# Patient Record
Sex: MALE | Race: WHITE | Employment: FULL TIME | URBAN - METROPOLITAN AREA
[De-identification: names, ages, dates, MRNs, and addresses within clinical notes are randomized per-mention and may not be internally consistent; named-entity substitution may affect disease eponyms.]

---

## 2018-05-16 ENCOUNTER — OFFICE VISIT (OUTPATIENT)
Dept: FAMILY MEDICINE CLINIC | Age: 38
End: 2018-05-16
Payer: COMMERCIAL

## 2018-05-16 VITALS
SYSTOLIC BLOOD PRESSURE: 120 MMHG | TEMPERATURE: 98.1 F | HEART RATE: 91 BPM | WEIGHT: 231 LBS | DIASTOLIC BLOOD PRESSURE: 80 MMHG | OXYGEN SATURATION: 98 % | RESPIRATION RATE: 18 BRPM | BODY MASS INDEX: 29.65 KG/M2 | HEIGHT: 74 IN

## 2018-05-16 DIAGNOSIS — J01.90 ACUTE SINUSITIS, RECURRENCE NOT SPECIFIED, UNSPECIFIED LOCATION: ICD-10-CM

## 2018-05-16 DIAGNOSIS — J02.9 ACUTE PHARYNGITIS, UNSPECIFIED ETIOLOGY: Primary | ICD-10-CM

## 2018-05-16 LAB — S PYO AG THROAT QL: NORMAL

## 2018-05-16 PROCEDURE — 99213 OFFICE O/P EST LOW 20 MIN: CPT | Performed by: PHYSICIAN ASSISTANT

## 2018-05-16 PROCEDURE — 87880 STREP A ASSAY W/OPTIC: CPT | Performed by: PHYSICIAN ASSISTANT

## 2018-05-16 RX ORDER — PREGABALIN 150 MG/1
150 CAPSULE ORAL DAILY
COMMUNITY

## 2018-05-16 RX ORDER — CYCLOBENZAPRINE HCL 10 MG
10 TABLET ORAL 3 TIMES DAILY PRN
COMMUNITY

## 2018-05-16 RX ORDER — TRAMADOL HYDROCHLORIDE 50 MG/1
50 TABLET ORAL EVERY 6 HOURS PRN
COMMUNITY

## 2018-05-16 RX ORDER — CEFDINIR 300 MG/1
300 CAPSULE ORAL 2 TIMES DAILY
Qty: 20 CAPSULE | Refills: 0 | Status: SHIPPED | OUTPATIENT
Start: 2018-05-16 | End: 2018-05-26

## 2018-06-08 ENCOUNTER — OFFICE VISIT (OUTPATIENT)
Dept: FAMILY MEDICINE CLINIC | Age: 38
End: 2018-06-08
Payer: COMMERCIAL

## 2018-06-08 VITALS
OXYGEN SATURATION: 96 % | RESPIRATION RATE: 12 BRPM | BODY MASS INDEX: 28.62 KG/M2 | WEIGHT: 223 LBS | TEMPERATURE: 98.8 F | SYSTOLIC BLOOD PRESSURE: 120 MMHG | DIASTOLIC BLOOD PRESSURE: 90 MMHG | HEART RATE: 96 BPM | HEIGHT: 74 IN

## 2018-06-08 DIAGNOSIS — J32.9 CHRONIC SINUSITIS, UNSPECIFIED LOCATION: Primary | ICD-10-CM

## 2018-06-08 DIAGNOSIS — H92.03 OTALGIA OF BOTH EARS: ICD-10-CM

## 2018-06-08 PROCEDURE — G8419 CALC BMI OUT NRM PARAM NOF/U: HCPCS | Performed by: PHYSICIAN ASSISTANT

## 2018-06-08 PROCEDURE — G8427 DOCREV CUR MEDS BY ELIG CLIN: HCPCS | Performed by: PHYSICIAN ASSISTANT

## 2018-06-08 PROCEDURE — 1036F TOBACCO NON-USER: CPT | Performed by: PHYSICIAN ASSISTANT

## 2018-06-08 PROCEDURE — 99213 OFFICE O/P EST LOW 20 MIN: CPT | Performed by: PHYSICIAN ASSISTANT

## 2018-06-08 RX ORDER — DOXYCYCLINE HYCLATE 100 MG
100 TABLET ORAL 2 TIMES DAILY
Qty: 20 TABLET | Refills: 0 | Status: SHIPPED | OUTPATIENT
Start: 2018-06-08 | End: 2018-06-18

## 2018-06-08 RX ORDER — METHYLPHENIDATE HYDROCHLORIDE 20 MG/1
20 TABLET ORAL
COMMUNITY

## 2019-05-28 ENCOUNTER — HOSPITAL ENCOUNTER (EMERGENCY)
Age: 39
Discharge: HOME | End: 2019-05-28
Payer: COMMERCIAL

## 2019-05-28 VITALS
WEIGHT: 232 LBS | RESPIRATION RATE: 20 BRPM | HEIGHT: 75 IN | TEMPERATURE: 98.7 F | HEART RATE: 108 BPM | BODY MASS INDEX: 28.85 KG/M2 | DIASTOLIC BLOOD PRESSURE: 94 MMHG | OXYGEN SATURATION: 100 % | SYSTOLIC BLOOD PRESSURE: 132 MMHG

## 2019-05-28 DIAGNOSIS — G89.29 ACUTE EXACERBATION OF CHRONIC LOW BACK PAIN: Primary | ICD-10-CM

## 2019-05-28 DIAGNOSIS — M54.50 ACUTE EXACERBATION OF CHRONIC LOW BACK PAIN: Primary | ICD-10-CM

## 2019-05-28 PROCEDURE — 6370000000 HC RX 637 (ALT 250 FOR IP): Performed by: NURSE PRACTITIONER

## 2019-05-28 PROCEDURE — 99282 EMERGENCY DEPT VISIT SF MDM: CPT

## 2019-05-28 PROCEDURE — 96372 THER/PROPH/DIAG INJ SC/IM: CPT

## 2019-05-28 PROCEDURE — 6360000002 HC RX W HCPCS: Performed by: NURSE PRACTITIONER

## 2019-05-28 RX ORDER — ONDANSETRON 4 MG/1
4 TABLET, ORALLY DISINTEGRATING ORAL ONCE
Status: COMPLETED | OUTPATIENT
Start: 2019-05-28 | End: 2019-05-28

## 2019-05-28 RX ORDER — MORPHINE SULFATE 2 MG/ML
4 INJECTION, SOLUTION INTRAMUSCULAR; INTRAVENOUS ONCE
Status: COMPLETED | OUTPATIENT
Start: 2019-05-28 | End: 2019-05-28

## 2019-05-28 RX ORDER — NAPROXEN 500 MG/1
500 TABLET ORAL 2 TIMES DAILY
Qty: 14 TABLET | Refills: 0 | Status: SHIPPED | OUTPATIENT
Start: 2019-05-28 | End: 2019-06-04

## 2019-05-28 RX ORDER — DEXAMETHASONE SODIUM PHOSPHATE 10 MG/ML
8 INJECTION INTRAMUSCULAR; INTRAVENOUS ONCE
Status: COMPLETED | OUTPATIENT
Start: 2019-05-28 | End: 2019-05-28

## 2019-05-28 RX ADMIN — MORPHINE SULFATE 4 MG: 2 INJECTION, SOLUTION INTRAMUSCULAR; INTRAVENOUS at 13:41

## 2019-05-28 RX ADMIN — ONDANSETRON 4 MG: 4 TABLET, ORALLY DISINTEGRATING ORAL at 13:41

## 2019-05-28 RX ADMIN — DEXAMETHASONE SODIUM PHOSPHATE 8 MG: 10 INJECTION INTRAMUSCULAR; INTRAVENOUS at 13:41

## 2019-05-28 ASSESSMENT — PAIN DESCRIPTION - ORIENTATION: ORIENTATION: MID

## 2019-05-28 ASSESSMENT — PAIN SCALES - GENERAL
PAINLEVEL_OUTOF10: 8
PAINLEVEL_OUTOF10: 8

## 2019-05-28 ASSESSMENT — PAIN DESCRIPTION - LOCATION: LOCATION: BACK

## 2019-05-28 ASSESSMENT — PAIN DESCRIPTION - PAIN TYPE: TYPE: ACUTE PAIN

## 2019-05-30 NOTE — ED PROVIDER NOTES
[facility]     Department of Emergency Medicine   ED  Provider Note  Admit Date/RoomTime: 5/28/2019 12:59 PM  ED Room: 30/30  Chief Complaint   Back Pain (Chronic back pain due to injury 15 years ago, woke up with worse pain this morning)    History of Present Illness   Source of history provided by:  patient. History/Exam Limitations: none. Cachorro Schmitzkeeper is a 44 y.o. old male with a prior history of recurrent self limited episodes of low back pain in the past, previous osteoarthritis of lumbar spine and previous herniated disc, presents to the emergency department by private vehicle, for acute-on-chronic, aching and cramping bilateral lower lumbar spine pain without radiation, for several day(s) prior to arrival worsening this am .  The original pain was caused by twisting. There has been no history of recent injury. Since onset the symptoms have been intermittent and mild in severity. He denies any of the following: bladder incontinence, bladder urgency, bowel incontinence, bowel urgency or sacral numbness. Associated Signs & Symptoms: none. The pain is aggraveated by movement and relieved by nothing. There has been no abdominal pain, cramping, vomiting, diarrhea, fever, chills, sweats, headache, arthralgias, myalgias, irritability, URI symptoms or cough. He is enrolled in pain management program.    .  ROS    Pertinent positives and negatives are stated within HPI, all other systems reviewed and are negative. Past Surgical History:  has a past surgical history that includes Appendectomy. Social History:  reports that he has never smoked. He has never used smokeless tobacco. He reports that he drinks alcohol. He reports that he does not use drugs. Family History: family history is not on file.   Allergies: Pcn [penicillins]    Physical Exam           ED Triage Vitals [05/28/19 1256]   BP Temp Temp Source Pulse Resp SpO2 Height Weight   (!) 132/94 98.7 °F (37.1 °C) Oral 108 20 100 % 6' 3\" (1.905 m) 232 lb (105.2 kg)      Oxygen Saturation Interpretation: Normal.    Constitutional:  Alert, development consistent with age. HEENT:  NC/NT. Airway patent. Neck:  Normal ROM. Supple. Respiratory:  Clear to auscultation and breath sounds equal.  CV:  Regular rate and rhythm, normal heart sounds, without pathological murmurs, ectopy, gallops, or rubs. GI:  Abdomen Soft, nontender, good bowel sounds. No firm or pulsatile mass. Back: lower lumbar spine bilateral.             Tenderness: Mild. Swelling: no.              Range of Motion: full range with pain. CVA Tenderness: No.            Straight leg raising:  Bilateral negative. Skin:  no erythema, rash or swelling noted. Distal Function:              Motor deficit: none. Sensory deficit: none. Pulse deficit: none. Calf Tenderness:  No Bilateral.               Edema:  none Both lower extremity(s). Reflexes: Bilateral knee,ankle,biceps normal.  Gait:  normal.  Integument:  Normal turgor. Warm, dry, without visible rash. Lymphatics: No lymphangitis or adenopathy noted. Neurological:  Oriented. Motor functions intact. Lab / Imaging Results   (All laboratory and radiology results have been personally reviewed by myself)  Labs:  No results found for this visit on 05/28/19. Imaging: All Radiology results interpreted by Radiologist unless otherwise noted.   No orders to display       ED Course / Medical Decision Making     Medications   morphine injection 4 mg (4 mg Intramuscular Given 5/28/19 1341)   dexamethasone (DECADRON) injection 8 mg (8 mg Intramuscular Given 5/28/19 1341)   ondansetron (ZOFRAN-ODT) disintegrating tablet 4 mg (4 mg Oral Given 5/28/19 1341)        Re-examination:  5/28/19       Time: 1350    Patients symptoms have improved with treatment    Consult(s):   None    Procedure(s):   none    Medical Decision Making/Counseling:    *At this time the patient is without objective evidence of an acute process requiring inpatient management. The emergency provider has spoken with the patient and discussed todays emergency visit, in addition to providing specific details for the plan of care and counseling regarding the diagnosis and prognosis. He was counseled on the role of the emergency department regarding prescribing medications for chronic conditions including Narcotic and other controlled substances. Based on the presenting complaint and nature of illness, the requested medications will not be provided today in prescription form and he is instructed to contact their provider for supplemental medications as soon as possible. Questions are answered at this time and they are agreeable with the plan. Assessment      1. Acute exacerbation of chronic low back pain      Plan   Discharge to home  Patient condition is good    New Medications     Discharge Medication List as of 5/28/2019  1:35 PM      START taking these medications    Details   naproxen (NAPROSYN) 500 MG tablet Take 1 tablet by mouth 2 times daily for 7 days, Disp-14 tablet, R-0Print           Electronically signed by ZACARIAS Rico CNP   DD: 5/30/19  **This report was transcribed using voice recognition software. Every effort was made to ensure accuracy; however, inadvertent computerized transcription errors may be present.   END OF ED PROVIDER NOTE     ZACARIAS Rico CNP  05/30/19 6874

## 2020-07-09 ENCOUNTER — HOSPITAL ENCOUNTER (OUTPATIENT)
Age: 40
Discharge: HOME | End: 2020-07-11

## 2020-07-09 PROCEDURE — 88305 TISSUE EXAM BY PATHOLOGIST: CPT

## 2024-03-10 ENCOUNTER — HOSPITAL ENCOUNTER (INPATIENT)
Age: 44
LOS: 2 days | Discharge: HOME | DRG: 882 | End: 2024-03-13
Attending: STUDENT IN AN ORGANIZED HEALTH CARE EDUCATION/TRAINING PROGRAM | Admitting: PSYCHIATRY & NEUROLOGY
Payer: COMMERCIAL

## 2024-03-10 DIAGNOSIS — R45.851 SUICIDAL IDEATION: Primary | ICD-10-CM

## 2024-03-10 LAB
ALBUMIN SERPL-MCNC: 4.8 G/DL (ref 3.5–5.2)
ALP SERPL-CCNC: 75 U/L (ref 40–129)
ALT SERPL-CCNC: 28 U/L (ref 0–40)
AMPHET UR QL SCN: NEGATIVE
ANION GAP SERPL CALCULATED.3IONS-SCNC: 12 MMOL/L (ref 7–16)
APAP SERPL-MCNC: <5 UG/ML (ref 10–30)
AST SERPL-CCNC: 20 U/L (ref 0–39)
BARBITURATES UR QL SCN: NEGATIVE
BASOPHILS # BLD: 0.07 K/UL (ref 0–0.2)
BASOPHILS NFR BLD: 1 % (ref 0–2)
BENZODIAZ UR QL: NEGATIVE
BILIRUB SERPL-MCNC: 0.3 MG/DL (ref 0–1.2)
BILIRUB UR QL STRIP: NEGATIVE
BUN SERPL-MCNC: 10 MG/DL (ref 6–20)
BUPRENORPHINE UR QL: NEGATIVE
CALCIUM SERPL-MCNC: 9.6 MG/DL (ref 8.6–10.2)
CANNABINOIDS UR QL SCN: NEGATIVE
CHLORIDE SERPL-SCNC: 102 MMOL/L (ref 98–107)
CLARITY UR: CLEAR
CO2 SERPL-SCNC: 26 MMOL/L (ref 22–29)
COCAINE UR QL SCN: NEGATIVE
COLOR UR: YELLOW
CREAT SERPL-MCNC: 1 MG/DL (ref 0.7–1.2)
EOSINOPHIL # BLD: 0.08 K/UL (ref 0.05–0.5)
EOSINOPHILS RELATIVE PERCENT: 1 % (ref 0–6)
ERYTHROCYTE [DISTWIDTH] IN BLOOD BY AUTOMATED COUNT: 12.8 % (ref 11.5–15)
ETHANOLAMINE SERPL-MCNC: 21 MG/DL
FENTANYL UR QL: NEGATIVE
GFR SERPL CREATININE-BSD FRML MDRD: >60 ML/MIN/1.73M2
GLUCOSE SERPL-MCNC: 94 MG/DL (ref 74–99)
GLUCOSE UR STRIP-MCNC: NEGATIVE MG/DL
HCT VFR BLD AUTO: 47.1 % (ref 37–54)
HGB BLD-MCNC: 15.5 G/DL (ref 12.5–16.5)
HGB UR QL STRIP.AUTO: NEGATIVE
IMM GRANULOCYTES # BLD AUTO: <0.03 K/UL (ref 0–0.58)
IMM GRANULOCYTES NFR BLD: 0 % (ref 0–5)
KETONES UR STRIP-MCNC: NEGATIVE MG/DL
LEUKOCYTE ESTERASE UR QL STRIP: ABNORMAL
LYMPHOCYTES NFR BLD: 1.39 K/UL (ref 1.5–4)
LYMPHOCYTES RELATIVE PERCENT: 23 % (ref 20–42)
MCH RBC QN AUTO: 29.5 PG (ref 26–35)
MCHC RBC AUTO-ENTMCNC: 32.9 G/DL (ref 32–34.5)
MCV RBC AUTO: 89.5 FL (ref 80–99.9)
METHADONE UR QL: NEGATIVE
MONOCYTES NFR BLD: 0.4 K/UL (ref 0.1–0.95)
MONOCYTES NFR BLD: 7 % (ref 2–12)
NEUTROPHILS NFR BLD: 67 % (ref 43–80)
NEUTS SEG NFR BLD: 4 K/UL (ref 1.8–7.3)
NITRITE UR QL STRIP: NEGATIVE
OPIATES UR QL SCN: NEGATIVE
OXYCODONE UR QL SCN: NEGATIVE
PCP UR QL SCN: NEGATIVE
PH UR STRIP: 6 [PH] (ref 5–9)
PLATELET # BLD AUTO: 299 K/UL (ref 130–450)
PMV BLD AUTO: 9.8 FL (ref 7–12)
POTASSIUM SERPL-SCNC: 3.7 MMOL/L (ref 3.5–5)
PROT SERPL-MCNC: 7.9 G/DL (ref 6.4–8.3)
PROT UR STRIP-MCNC: NEGATIVE MG/DL
RBC # BLD AUTO: 5.26 M/UL (ref 3.8–5.8)
RBC #/AREA URNS HPF: ABNORMAL /HPF
SALICYLATES SERPL-MCNC: <0.3 MG/DL (ref 0–30)
SODIUM SERPL-SCNC: 140 MMOL/L (ref 132–146)
SP GR UR STRIP: 1.02 (ref 1–1.03)
TEST INFORMATION: NORMAL
TOXIC TRICYCLIC SC,BLOOD: NEGATIVE
UROBILINOGEN UR STRIP-ACNC: 0.2 EU/DL (ref 0–1)
WBC #/AREA URNS HPF: ABNORMAL /HPF
WBC OTHER # BLD: 6 K/UL (ref 4.5–11.5)

## 2024-03-10 PROCEDURE — 80143 DRUG ASSAY ACETAMINOPHEN: CPT

## 2024-03-10 PROCEDURE — 99285 EMERGENCY DEPT VISIT HI MDM: CPT

## 2024-03-10 PROCEDURE — 85025 COMPLETE CBC W/AUTO DIFF WBC: CPT

## 2024-03-10 PROCEDURE — 80179 DRUG ASSAY SALICYLATE: CPT

## 2024-03-10 PROCEDURE — G0480 DRUG TEST DEF 1-7 CLASSES: HCPCS

## 2024-03-10 PROCEDURE — 80307 DRUG TEST PRSMV CHEM ANLYZR: CPT

## 2024-03-10 PROCEDURE — 80053 COMPREHEN METABOLIC PANEL: CPT

## 2024-03-10 PROCEDURE — 81001 URINALYSIS AUTO W/SCOPE: CPT

## 2024-03-10 PROCEDURE — 6370000000 HC RX 637 (ALT 250 FOR IP): Performed by: STUDENT IN AN ORGANIZED HEALTH CARE EDUCATION/TRAINING PROGRAM

## 2024-03-10 PROCEDURE — 93005 ELECTROCARDIOGRAM TRACING: CPT

## 2024-03-10 RX ORDER — ACETAMINOPHEN 325 MG/1
650 TABLET ORAL ONCE
Status: COMPLETED | OUTPATIENT
Start: 2024-03-10 | End: 2024-03-10

## 2024-03-10 RX ADMIN — ACETAMINOPHEN 650 MG: 325 TABLET ORAL at 21:25

## 2024-03-10 ASSESSMENT — PAIN DESCRIPTION - LOCATION
LOCATION: HEAD
LOCATION: HEAD

## 2024-03-10 ASSESSMENT — LIFESTYLE VARIABLES
HOW OFTEN DO YOU HAVE A DRINK CONTAINING ALCOHOL: 2-3 TIMES A WEEK
HOW MANY STANDARD DRINKS CONTAINING ALCOHOL DO YOU HAVE ON A TYPICAL DAY: 3 OR 4

## 2024-03-10 ASSESSMENT — PAIN DESCRIPTION - ONSET
ONSET: ON-GOING
ONSET: ON-GOING

## 2024-03-10 ASSESSMENT — PAIN DESCRIPTION - FREQUENCY
FREQUENCY: CONTINUOUS
FREQUENCY: CONTINUOUS

## 2024-03-10 ASSESSMENT — PAIN DESCRIPTION - PAIN TYPE
TYPE: ACUTE PAIN
TYPE: ACUTE PAIN

## 2024-03-10 ASSESSMENT — PAIN SCALES - GENERAL
PAINLEVEL_OUTOF10: 8
PAINLEVEL_OUTOF10: 4

## 2024-03-10 ASSESSMENT — PAIN - FUNCTIONAL ASSESSMENT
PAIN_FUNCTIONAL_ASSESSMENT: 0-10
PAIN_FUNCTIONAL_ASSESSMENT: NONE - DENIES PAIN

## 2024-03-11 PROBLEM — F43.25 ADJUSTMENT DISORDER WITH MIXED DISTURBANCE OF EMOTIONS AND CONDUCT: Status: ACTIVE | Noted: 2024-03-11

## 2024-03-11 PROBLEM — F32.9 MAJOR DEPRESSION, SINGLE EPISODE: Status: RESOLVED | Noted: 2024-03-11 | Resolved: 2024-03-11

## 2024-03-11 PROBLEM — F32.9 MAJOR DEPRESSION, SINGLE EPISODE: Status: ACTIVE | Noted: 2024-03-11

## 2024-03-11 PROCEDURE — 6370000000 HC RX 637 (ALT 250 FOR IP): Performed by: PSYCHIATRY & NEUROLOGY

## 2024-03-11 PROCEDURE — 1240000000 HC EMOTIONAL WELLNESS R&B

## 2024-03-11 PROCEDURE — 90792 PSYCH DIAG EVAL W/MED SRVCS: CPT | Performed by: NURSE PRACTITIONER

## 2024-03-11 PROCEDURE — 6370000000 HC RX 637 (ALT 250 FOR IP): Performed by: NURSE PRACTITIONER

## 2024-03-11 RX ORDER — TRAMADOL HYDROCHLORIDE 50 MG/1
50 TABLET ORAL EVERY 6 HOURS PRN
Status: DISCONTINUED | OUTPATIENT
Start: 2024-03-11 | End: 2024-03-13 | Stop reason: HOSPADM

## 2024-03-11 RX ORDER — HALOPERIDOL 5 MG/ML
5 INJECTION INTRAMUSCULAR EVERY 6 HOURS PRN
Status: DISCONTINUED | OUTPATIENT
Start: 2024-03-11 | End: 2024-03-13 | Stop reason: HOSPADM

## 2024-03-11 RX ORDER — LANOLIN ALCOHOL/MO/W.PET/CERES
3 CREAM (GRAM) TOPICAL NIGHTLY PRN
Status: DISCONTINUED | OUTPATIENT
Start: 2024-03-11 | End: 2024-03-13 | Stop reason: HOSPADM

## 2024-03-11 RX ORDER — HALOPERIDOL 5 MG/1
5 TABLET ORAL EVERY 6 HOURS PRN
Status: DISCONTINUED | OUTPATIENT
Start: 2024-03-11 | End: 2024-03-13 | Stop reason: HOSPADM

## 2024-03-11 RX ORDER — CYCLOBENZAPRINE HCL 10 MG
10 TABLET ORAL 3 TIMES DAILY PRN
Status: DISCONTINUED | OUTPATIENT
Start: 2024-03-11 | End: 2024-03-13 | Stop reason: HOSPADM

## 2024-03-11 RX ORDER — DIVALPROEX SODIUM 500 MG/1
500 TABLET, EXTENDED RELEASE ORAL NIGHTLY
Status: DISCONTINUED | OUTPATIENT
Start: 2024-03-11 | End: 2024-03-13 | Stop reason: HOSPADM

## 2024-03-11 RX ORDER — MAGNESIUM HYDROXIDE/ALUMINUM HYDROXICE/SIMETHICONE 120; 1200; 1200 MG/30ML; MG/30ML; MG/30ML
30 SUSPENSION ORAL PRN
Status: DISCONTINUED | OUTPATIENT
Start: 2024-03-11 | End: 2024-03-13 | Stop reason: HOSPADM

## 2024-03-11 RX ORDER — HYDROXYZINE PAMOATE 25 MG/1
50 CAPSULE ORAL 3 TIMES DAILY PRN
Status: DISCONTINUED | OUTPATIENT
Start: 2024-03-11 | End: 2024-03-13 | Stop reason: HOSPADM

## 2024-03-11 RX ORDER — NICOTINE 21 MG/24HR
1 PATCH, TRANSDERMAL 24 HOURS TRANSDERMAL DAILY
Status: DISCONTINUED | OUTPATIENT
Start: 2024-03-11 | End: 2024-03-11

## 2024-03-11 RX ORDER — ACETAMINOPHEN 325 MG/1
650 TABLET ORAL EVERY 6 HOURS PRN
Status: DISCONTINUED | OUTPATIENT
Start: 2024-03-11 | End: 2024-03-13 | Stop reason: HOSPADM

## 2024-03-11 RX ORDER — PREGABALIN 75 MG/1
150 CAPSULE ORAL DAILY
Status: DISCONTINUED | OUTPATIENT
Start: 2024-03-11 | End: 2024-03-13 | Stop reason: HOSPADM

## 2024-03-11 RX ADMIN — DIVALPROEX SODIUM 500 MG: 500 TABLET, EXTENDED RELEASE ORAL at 21:06

## 2024-03-11 RX ADMIN — MILNACIPRAN HYDROCHLORIDE 50 MG: 100 TABLET, FILM COATED ORAL at 21:05

## 2024-03-11 RX ADMIN — ACETAMINOPHEN 650 MG: 325 TABLET ORAL at 20:02

## 2024-03-11 RX ADMIN — Medication 3 MG: at 21:05

## 2024-03-11 RX ADMIN — TRAMADOL HYDROCHLORIDE 50 MG: 50 TABLET ORAL at 17:01

## 2024-03-11 RX ADMIN — PREGABALIN 150 MG: 75 CAPSULE ORAL at 16:59

## 2024-03-11 RX ADMIN — MILNACIPRAN HYDROCHLORIDE 50 MG: 100 TABLET, FILM COATED ORAL at 16:59

## 2024-03-11 ASSESSMENT — PAIN DESCRIPTION - LOCATION
LOCATION: BACK
LOCATION: HEAD
LOCATION: HEAD

## 2024-03-11 ASSESSMENT — PATIENT HEALTH QUESTIONNAIRE - PHQ9
SUM OF ALL RESPONSES TO PHQ QUESTIONS 1-9: 0
SUM OF ALL RESPONSES TO PHQ9 QUESTIONS 1 & 2: 0
SUM OF ALL RESPONSES TO PHQ QUESTIONS 1-9: 0
2. FEELING DOWN, DEPRESSED OR HOPELESS: 0
SUM OF ALL RESPONSES TO PHQ QUESTIONS 1-9: 0
1. LITTLE INTEREST OR PLEASURE IN DOING THINGS: 0
SUM OF ALL RESPONSES TO PHQ QUESTIONS 1-9: 0
1. LITTLE INTEREST OR PLEASURE IN DOING THINGS: 0

## 2024-03-11 ASSESSMENT — PAIN DESCRIPTION - FREQUENCY
FREQUENCY: CONTINUOUS
FREQUENCY: CONTINUOUS

## 2024-03-11 ASSESSMENT — SLEEP AND FATIGUE QUESTIONNAIRES
SLEEP PATTERN: DISTURBED/INTERRUPTED SLEEP
AVERAGE NUMBER OF SLEEP HOURS: 7
DO YOU HAVE DIFFICULTY SLEEPING: NO
DO YOU USE A SLEEP AID: YES
DO YOU USE A SLEEP AID: NO
AVERAGE NUMBER OF SLEEP HOURS: 7
DO YOU HAVE DIFFICULTY SLEEPING: YES

## 2024-03-11 ASSESSMENT — PAIN DESCRIPTION - PAIN TYPE
TYPE: ACUTE PAIN
TYPE: ACUTE PAIN

## 2024-03-11 ASSESSMENT — PAIN DESCRIPTION - DESCRIPTORS: DESCRIPTORS: ACHING

## 2024-03-11 ASSESSMENT — PAIN SCALES - GENERAL
PAINLEVEL_OUTOF10: 6
PAINLEVEL_OUTOF10: 4
PAINLEVEL_OUTOF10: 4

## 2024-03-11 ASSESSMENT — PAIN DESCRIPTION - ONSET
ONSET: ON-GOING
ONSET: ON-GOING

## 2024-03-11 NOTE — ED PROVIDER NOTES
Department of Emergency Medicine     Written by: Moises Henry MD  Patient Name: Dominic Phan  Admit Date: 3/10/2024  8:31 PM  MRN: [id number]                   : 1980    HPI  Chief Complaint   Patient presents with    Suicidal     (With a plan, patient states that he had a gun next to him in truck)       Dominic Phan is a 44 y.o. male that presents to the ED with concerns of suicidal ideation.  Patient had domestic issues with the last 3 weeks where his wife is cheating on him.  Today he was found next appreciation with a gun to his head, he threatened to kill himself.  He has drank alcohol today and over the past few days more than normal.  No drug use.  No chest pain shortness of abdominal pain nausea vomiting.  The patient mentions his mother is a psychologist, and he is looking for help.  He says he has been seeing a therapist over the last 2 weeks which has not gone well    Review of systems:  Pertinent positives and negatives mentioned in the HPI/MDM.    Physical Exam  Constitutional:       General: He is not in acute distress.     Appearance: Normal appearance.   Eyes:      Extraocular Movements: Extraocular movements intact.      Pupils: Pupils are equal, round, and reactive to light.   Cardiovascular:      Rate and Rhythm: Normal rate and regular rhythm.      Pulses: Normal pulses.      Heart sounds: Normal heart sounds.   Pulmonary:      Effort: Pulmonary effort is normal. No respiratory distress.   Abdominal:      Palpations: Abdomen is soft.      Tenderness: There is no abdominal tenderness.   Neurological:      Mental Status: He is alert and oriented to person, place, and time. Mental status is at baseline.      Comments: Sad affect          Chart review: The patient was seen by rheumatology for fibromyalgia and chronic pain syndrome on 2019    Social determinants: Suicidal ideation, held in behavioral health unit    Acute or chronic illness limiting or affecting

## 2024-03-11 NOTE — ED TRIAGE NOTES
Patient stated he had a gun next to him in his truck, recently found out his wife was having an affair, states he has been with her for a long time. They have 3 children together and the thought of them is what stopped him.

## 2024-03-11 NOTE — CARE COORDINATION
Biopsychosocial Assessment Note    Social work met with patient to complete the biopsychosocial assessment and C-SSRS.     Chief Complaint: pt reports \"because I verbalized that I was thinking of killing myself. I was in a car with an empty gun.\"     Mental Status Exam: pt alert&oriented x4. Pt cooperative, friendly. Pt mood anxious, sad, affect worried, congruent. Pt eye contact fair, speech clear. Pt thoughts linear, logical. Pt insight/judgement poor. Pt denied SI/HI/AVH.    Clinical Summary: pt reports he verbalized he was thinking about killing himself with an empty gun next to him. Pt has been  to his wife for 20 years and with her in general for almost 30. He found out about three weeks ago that she was having an affair for several moths. Pt reports they started to see a therapist because of this. Pt reports he has never felt depressed and he does not think he was depressed prior to admission. Pt reports feeling sad. Pt stated he holds all of his emotions in and has \"always been the though arnie.\" Pt reports prior to crying on the day of admission, he had only cried two other times in 25 years. Pt reports prior to admission his wife was preparing a meal for the family. When he pulled into his driveway he started crying. Pt reports he didn't want his kids to see him that way so he stayed in the car. After about an hour of crying he started to have negative thoughts. Pt reports his wife knew he was going to be home in 10 minutes but no one called to see where he was. After an hour and a half his wife texted him and then came outside. Pt reports his wife laughed off him saying \"what if I put a gun to my head and pulled the trigger.\" Pt stated he was thinking to himself what they would be better off without him. Pt reports his wife just told him to come inside for dinner and walked away. Pt report that turned his sadness into anger and resentment. Pt reports at that moment he went to the garage, got a gun,

## 2024-03-11 NOTE — ED NOTES
Called Dr. Mccarthy to present patient for admission, waiting for call back from physician.   
Patient continues to sleep, no distress noted.   
Patient currently A&OX4, respirations non-labored, skin warm/dry, no distress noted. Patient calm and cooperative at present. Patient denies SI, HI, or any hallucinations at present. Patient cooperative for vitals. Patient denies being a daily alcohol drinker. Patient denies any history of alcohol withdrawal. Patient's only complaint at present is headache but states he thinks headache is from crying for hours earlier.   
Patient was accepted to [facility] by Dr. Mccarthy. Per YAN Patel on [facility] patient will go to bed 7519B. Called and notified Pepper in admitting.    
Pt also has a watch in small bag placed together in locker 27.  
Pt arrived through EMS calm cooperative and changed, 2 bags of belongings including a cellphone and a wallet in locker 27, offered water and a blanket.  
Registration at bedside at this time.   
Registration has not yet registered patient.   
Report given to YAN Houser.    
Second call to Dr. Mccarthy to present patient for admission, waiting for call back from physician.   
Spoke to Dr. Ramos who states patient does not need constant observation in ED Section G, patient okay for 15 minute monitoring. Other suicide precautions to remain in place.   
Telephone report called to floor, spoke to YAN Hensley who requested transport not be requested until 0730 due to shift change.     
Third call to Dr. Mccarthy to present patient, waiting for call back from physician.  
yrs    Education Level:  [employer] year [employer]- joined [employer] after 911 started    Violence Risk Screening:        Have you ever thought about hurting someone?   []  No  [x]  Yes (Ask the questions listed below)  Only while in the service   When?    Did you follow through with the thoughts?      [] No     [] Yes- When and what happened?  2.  Have you ever threatened anyone?  [x]  No  []  Yes (Ask the questions listed below)   When and what happened?    Have you ever threatened someone with a gun, knife or other weapon?   []  No  []  Yes - When and what happened?  2. Have you ever had an order of protection taken out against you? []  Yes [x]  No  3. Have you ever been arrested due to violence? []  Yes [x]  No  4. Have you ever been cruel to animals? []  Yes [x]  No    After consideration of C-SSRS screening results, C-SSRS assessments, and this professional's assessment the patient's overall suicide risk assessed to be:  [] No Risk  [] Low   [] Moderate   [x] High     [x] Discussed current suicide risk, protective and risk factors with RN and ED Physician.    Consulted with ED Physician. Disposition/level of care recommended at this time:  [] Home:   [] Outpatient Provider:   [] Crisis Unit:   [x] Inpatient Psychiatric Unit:  Review for admission  [] Other:

## 2024-03-11 NOTE — H&P
mouth 2 times daily for 7 days  methylphenidate (RITALIN) 20 MG tablet, Take 1 tablet by mouth. (Patient not taking: Reported on 3/11/2024)  pregabalin (LYRICA) 150 MG capsule, Take 1 capsule by mouth daily.  Milnacipran HCl (SAVELLA PO), Take by mouth 2 times daily  traMADol (ULTRAM) 50 MG tablet, Take 1 tablet by mouth every 6 hours as needed for Pain. Up to 5x times daily prn  cyclobenzaprine (FLEXERIL) 10 MG tablet, Take 1 tablet by mouth 3 times daily as needed for Muscle spasms  Finasteride (PROPECIA PO), Take by mouth        Past Medical History:        Diagnosis Date    ADD (attention deficit disorder)     Broken back     Fibromyalgia     Fibromyalgia     Viral meningitis        Past Surgical History:        Procedure Laterality Date    APPENDECTOMY         Allergies:   Pcn [penicillins]      EXAMINATION:    REVIEW OF SYSTEMS:    ROS:  [x] All negative/unchanged except if checked. Explain positive(checked items) below:  [] Constitutional  [] Eyes  [] Ear/Nose/Mouth/Throat  [] Respiratory  [] CV  [] GI  []   [] Musculoskeletal  [] Skin/Breast  [] Neurological  [] Endocrine  [] Heme/Lymph  [] Allergic/Immunologic    Explanation:     Vitals:  BP (!) 135/95   Pulse (!) 104   Temp 97.3 °F (36.3 °C) (Temporal)   Resp 16   Ht 1.905 m (6' 3\")   Wt 104.3 kg (230 lb)   SpO2 97%   BMI 28.75 kg/m²      Mental Status Examination:      Mental status exam revealed a 43 year-old male in a hospital gown, good hygiene and grooming. Cooperative and forthcoming in revealing information. Psychomotor revealed no agitation, retardation, or any other abnormal or odd postures. Speech was coherent, normal rate, rhythm, and tone. Eye contact is good. Mood was \"I am normal and humbled\" and affect was mood congruent calm and contemplative, non-tearful, non-agitated.    Thought process is logical, without flight of ideas, or looseness of association. Thought contents are devoid of auditory or visual hallucinations, delusions or

## 2024-03-12 VITALS
OXYGEN SATURATION: 99 % | HEIGHT: 75 IN | SYSTOLIC BLOOD PRESSURE: 137 MMHG | DIASTOLIC BLOOD PRESSURE: 91 MMHG | HEART RATE: 86 BPM | BODY MASS INDEX: 28.6 KG/M2 | WEIGHT: 230 LBS | TEMPERATURE: 98.4 F | RESPIRATION RATE: 16 BRPM

## 2024-03-12 LAB
CHOLEST SERPL-MCNC: 191 MG/DL
HBA1C MFR BLD: 5.5 % (ref 4–5.6)
HDLC SERPL-MCNC: 86 MG/DL
LDLC SERPL CALC-MCNC: 85 MG/DL
TRIGL SERPL-MCNC: 100 MG/DL
VLDLC SERPL CALC-MCNC: 20 MG/DL

## 2024-03-12 PROCEDURE — 99232 SBSQ HOSP IP/OBS MODERATE 35: CPT | Performed by: NURSE PRACTITIONER

## 2024-03-12 PROCEDURE — 83036 HEMOGLOBIN GLYCOSYLATED A1C: CPT

## 2024-03-12 PROCEDURE — 36415 COLL VENOUS BLD VENIPUNCTURE: CPT

## 2024-03-12 PROCEDURE — 80061 LIPID PANEL: CPT

## 2024-03-12 PROCEDURE — 6370000000 HC RX 637 (ALT 250 FOR IP): Performed by: NURSE PRACTITIONER

## 2024-03-12 PROCEDURE — 6370000000 HC RX 637 (ALT 250 FOR IP): Performed by: PSYCHIATRY & NEUROLOGY

## 2024-03-12 PROCEDURE — 1240000000 HC EMOTIONAL WELLNESS R&B

## 2024-03-12 RX ADMIN — MILNACIPRAN HYDROCHLORIDE 50 MG: 100 TABLET, FILM COATED ORAL at 08:52

## 2024-03-12 RX ADMIN — PREGABALIN 150 MG: 75 CAPSULE ORAL at 08:52

## 2024-03-12 RX ADMIN — TRAMADOL HYDROCHLORIDE 50 MG: 50 TABLET ORAL at 21:02

## 2024-03-12 RX ADMIN — Medication 3 MG: at 21:02

## 2024-03-12 RX ADMIN — TRAMADOL HYDROCHLORIDE 50 MG: 50 TABLET ORAL at 07:05

## 2024-03-12 RX ADMIN — CYCLOBENZAPRINE 10 MG: 10 TABLET, FILM COATED ORAL at 21:33

## 2024-03-12 RX ADMIN — MILNACIPRAN HYDROCHLORIDE 50 MG: 100 TABLET, FILM COATED ORAL at 21:02

## 2024-03-12 RX ADMIN — DIVALPROEX SODIUM 500 MG: 500 TABLET, EXTENDED RELEASE ORAL at 21:02

## 2024-03-12 RX ADMIN — TRAMADOL HYDROCHLORIDE 50 MG: 50 TABLET ORAL at 13:07

## 2024-03-12 ASSESSMENT — PAIN DESCRIPTION - DESCRIPTORS
DESCRIPTORS: ACHING;SPASM
DESCRIPTORS: ACHING;DISCOMFORT;SORE
DESCRIPTORS: ACHING
DESCRIPTORS: ACHING

## 2024-03-12 ASSESSMENT — PAIN - FUNCTIONAL ASSESSMENT
PAIN_FUNCTIONAL_ASSESSMENT: ACTIVITIES ARE NOT PREVENTED

## 2024-03-12 ASSESSMENT — PAIN DESCRIPTION - ORIENTATION
ORIENTATION: MID;LOWER
ORIENTATION: UPPER;MID

## 2024-03-12 ASSESSMENT — PAIN SCALES - GENERAL
PAINLEVEL_OUTOF10: 2
PAINLEVEL_OUTOF10: 6
PAINLEVEL_OUTOF10: 5
PAINLEVEL_OUTOF10: 5
PAINLEVEL_OUTOF10: 2
PAINLEVEL_OUTOF10: 5

## 2024-03-12 ASSESSMENT — PAIN DESCRIPTION - LOCATION
LOCATION: BACK

## 2024-03-12 NOTE — CARE COORDINATION
LUPE called Pt's wife Berenice [phone] (NELSON SIGNED) to obtain collateral information. Berenice reported pt and her have been having marital issues for the past several months. Berenice reported that the pt found out she was having an \"emotional relationship\" with another man for 3-4 months mid February and he became emotional. Berenice reported they recently began seeing a marriage counselor soon after. Berenice reported they only had two sessions with the counselor and the pt would talk about himself for the first session and how Berenice is a terrible person. Berenice reported when the therapist began giving him \"pushback\" he did not like this and felt like \"she was not on my side.\" The following appointment they had at the marriage counselor Berenice reported that the pt became angry with the therapist after more pushback and began calling her a \"quack\" and started saying personal things about the therapist that he found on the internet and that made the therapist uncomfortable. Berenice reported that night she stayed at their [facility] in [place] due to Harvey not taking the counseling serious.    Berenice reported the following Saturday the pt wanted to \"hash things out\" but appeared emotional throughout the day. Berenice reported that afternoon around 5-5:30 pm the pt went to [place] to a friend's house and stayed there for the rest of the day helping him move furniture around. Berenice reported the pt stayed there over night and Sunday morning. Berenice reported both the pt and their son came home around the same time Sunday evening and the son saw the pt sitting in his car. Berenice reported she texted the pt to see if he was coming in due to her making a big dinner for the family. The pt reported he has been out in the car for an hour \"uncontrollably sobbing.\" Berenice reported she went out to check in on the pt and he began saying, \"I shouldn't be here anymore\" and Berenice became worried. Berenice reported she stayed with the pt for some time but went back

## 2024-03-12 NOTE — GROUP NOTE
Group Therapy Note    Date: 3/12/2024    Group Start Time: 1025  Group End Time: 1100  Group Topic: Cognitive Skills    [facility]    Choco Acosta, GLADYS WHITNEY        Group Therapy Note    Attendees: 11       Patient's Goal:  Pt attended group therapy where we discussed \"Fair Fighting Rules\" - ways to handle conflict appropriately.    Notes:  Pt was an active participant in group therapy.    Status After Intervention:  Unchanged    Participation Level: Active Listener and Interactive    Participation Quality: Appropriate, Attentive, and Sharing      Speech:  normal      Thought Process/Content: Logical      Affective Functioning: Congruent      Mood: euthymic      Level of consciousness:  Alert, Oriented x4, and Attentive      Response to Learning: Able to verbalize current knowledge/experience, Able to verbalize/acknowledge new learning, and Able to retain information      Endings: None Reported    Modes of Intervention: Education, Support, Socialization, Exploration, Clarifying, and Problem-solving      Discipline Responsible: /Counselor      Signature:  CHELO Shukla, GLADYS

## 2024-03-12 NOTE — GROUP NOTE
Group Therapy Note    Date: 3/12/2024    Group Start Time: 1100  Group End Time: 1135  Group Topic: Psychotherapy    [facility]    Mark Carver MSW, LSW        Group Therapy Note    Attendees: 6       Patient's Goal:  To increase social interaction and improve relationships with others.      Notes:  Pt was attentive in group and was able to identify an agenda. They were also able to verbalize relating to others within the group.      Status After Intervention:  Improved    Participation Level: Active Listener and Interactive    Participation Quality: Appropriate, Attentive, Sharing, and Supportive      Speech:  normal      Thought Process/Content: Logical      Affective Functioning: Congruent      Mood: anxious      Level of consciousness:  Alert and Oriented x4      Response to Learning: Able to verbalize current knowledge/experience, Able to verbalize/acknowledge new learning, and Able to retain information      Endings: None Reported    Modes of Intervention: Support, Socialization, and Exploration      Discipline Responsible: /Counselor      Signature:  CHELO Garcia LSW

## 2024-03-12 NOTE — GROUP NOTE
Group Therapy Note    Date: 3/12/2024    Group Start Time: 0215  Group End Time: 0300  Group Topic: Cognitive Skills    [facility]    Choco Acosta MSW, LSW        Group Therapy Note    Attendees: 13       Patient's Goal:  Pt attended group therapy where we discussed anxiety - symptoms and treatments available.  We also discussed coping skills.    Notes:  Pt was an active participant in group therapy.    Status After Intervention:  Improved    Participation Level: Active Listener and Interactive    Participation Quality: Appropriate, Attentive, and Sharing      Speech:  normal      Thought Process/Content: Logical      Affective Functioning: Congruent      Mood: euthymic      Level of consciousness:  Alert, Oriented x4, and Attentive      Response to Learning: Able to verbalize current knowledge/experience, Able to verbalize/acknowledge new learning, Able to retain information, and Capable of insight      Endings: None Reported    Modes of Intervention: Education, Support, Socialization, Exploration, Clarifying, and Problem-solving      Discipline Responsible: /Counselor      Signature:  CHELO Shukla, GLADYS

## 2024-03-12 NOTE — CARE COORDINATION
LUPE called Pt's wife Berenice [phone] (NELSON SIGNED) to obtain collateral information. No answer, LUPE left a VM to return call.

## 2024-03-12 NOTE — GROUP NOTE
Group Therapy Note    Date: 3/12/2024    Group Start Time: 1010  Group End Time: 1025  Group Topic: Community Meeting    [facility]    Choco Acosta MSW, LSW        Group Therapy Note    Attendees: 11       Patient's Goal:  Pt attended community meeting where we discussed unit rules and expectations.    Notes:  Pt was an active listener in group.  Pt identified their daily goal as, \"be a better listener.\"    Status After Intervention:  Unchanged    Participation Level: Active Listener    Participation Quality: Appropriate and Attentive      Speech:  normal      Thought Process/Content: Logical      Affective Functioning: Congruent      Mood: euthymic      Level of consciousness:  Alert, Oriented x4, and Attentive      Response to Learning: Able to retain information      Endings: None Reported    Modes of Intervention: Education and Support      Discipline Responsible: /Counselor      Signature:  CHELO Shukla, GLADYS

## 2024-03-13 LAB
EKG ATRIAL RATE: 87 BPM
EKG P AXIS: 3 DEGREES
EKG P-R INTERVAL: 156 MS
EKG Q-T INTERVAL: 396 MS
EKG QRS DURATION: 86 MS
EKG QTC CALCULATION (BAZETT): 476 MS
EKG R AXIS: 5 DEGREES
EKG T AXIS: 23 DEGREES
EKG VENTRICULAR RATE: 87 BPM
VALPROATE SERPL-MCNC: 22 UG/ML (ref 50–100)

## 2024-03-13 PROCEDURE — 93010 ELECTROCARDIOGRAM REPORT: CPT | Performed by: INTERNAL MEDICINE

## 2024-03-13 PROCEDURE — 36415 COLL VENOUS BLD VENIPUNCTURE: CPT

## 2024-03-13 PROCEDURE — 80164 ASSAY DIPROPYLACETIC ACD TOT: CPT

## 2024-03-13 PROCEDURE — 99239 HOSP IP/OBS DSCHRG MGMT >30: CPT | Performed by: NURSE PRACTITIONER

## 2024-03-13 PROCEDURE — 6370000000 HC RX 637 (ALT 250 FOR IP): Performed by: NURSE PRACTITIONER

## 2024-03-13 RX ORDER — DIVALPROEX SODIUM 500 MG/1
500 TABLET, EXTENDED RELEASE ORAL NIGHTLY
Qty: 30 TABLET | Refills: 0 | Status: SHIPPED | OUTPATIENT
Start: 2024-03-13 | End: 2024-04-12

## 2024-03-13 RX ADMIN — MILNACIPRAN HYDROCHLORIDE 50 MG: 100 TABLET, FILM COATED ORAL at 09:42

## 2024-03-13 RX ADMIN — TRAMADOL HYDROCHLORIDE 50 MG: 50 TABLET ORAL at 09:41

## 2024-03-13 RX ADMIN — PREGABALIN 150 MG: 75 CAPSULE ORAL at 09:43

## 2024-03-13 ASSESSMENT — PAIN SCALES - GENERAL
PAINLEVEL_OUTOF10: 0
PAINLEVEL_OUTOF10: 5
PAINLEVEL_OUTOF10: 0

## 2024-03-13 ASSESSMENT — PAIN DESCRIPTION - DESCRIPTORS: DESCRIPTORS: ACHING;DISCOMFORT;SORE

## 2024-03-13 ASSESSMENT — PAIN DESCRIPTION - LOCATION: LOCATION: BACK

## 2024-03-13 ASSESSMENT — PAIN - FUNCTIONAL ASSESSMENT: PAIN_FUNCTIONAL_ASSESSMENT: ACTIVITIES ARE NOT PREVENTED

## 2024-03-13 ASSESSMENT — PAIN DESCRIPTION - ORIENTATION: ORIENTATION: MID

## 2024-03-13 NOTE — CARE COORDINATION
Pt was seen during treatment team. Pt reports feeling good today, denied SI/HI/AVH. Pt reports the medications are good and he is ready to be discharged. Pt spoke with his mom and his wife and his wife will be staying at their condo for a few days while the pt stays in the home. Pt stated they are going to let their kids decide where they want to stay for the next few days. Pt reports his son has a basketball playoff game tonight in [place] that he is looking forward to attending. Pt stated his daughter has a volleyball tournament in [place] this weekend that he is also looking forward to attending. Pt stated him and his wife will see what they want to do after this weekend. Pt has been eating and sleeping ok and has been attending groups. Pt would like to be referred to a new mental health provider in the [facility] area at this time. LUPE advised the pt that SW will also place a list of additional outpatient agencies in his discharge paperwork for future reference. Collateral was gained from pt wife who confirmed that all the guns and weapons were removed from the home already and were taken off the property. Pt cooperative, calm, pleasant, appropriate, with good eye contact, clear speech, improved insight/judgement.     LUPE contacted [facility] [phone] to discuss referring the pt for services. No answer, a voicemail was left.     LUPE contacted [facility] [phone] to discuss referring the pt for services. The pt has an intake appointment on 3/20 for counseling services. The pt will be scheduled for medication management services within 2 weeks of this appointment.     LUPE contacted pt wife Berenice [phone] (NELSON signed) to notify her of pt discharge for today. No answer, a voicemail was left.     In order to ensure appropriate transition and discharge planning is in place, the following documents have been transmitted to [facility], as the new outpatient provider:    The d/c

## 2024-03-13 NOTE — GROUP NOTE
Shared goal for the day as to be well.                                                                       Group Therapy Note    Date: 3/13/2024    Group Start Time: 0930  Group End Time: 0945  Group Topic: Community Meeting    [facility]    Marilou Meyer, CTRS    Date: 3/13/2024    Type of Group: Community Meeting    Patient's Goal:  Patient will be able to identify staffing assignments, patient expectations and flow of the day.     Notes:  pleasant and sharing in group, able to identify goal for the day.     Status After Intervention:  Improved    Participation Level: Active Listener and Interactive    Participation Quality: Appropriate, Attentive, and Sharing      Speech:  normal      Thought Process/Content: Logical      Affective Functioning: Congruent      Mood: euthymic      Level of consciousness:  Alert, Oriented x4, and Attentive      Response to Learning: Able to verbalize/acknowledge new learning, Able to retain information, and Progressing to goal      Endings: None Reported    Modes of Intervention: Support, Socialization, and Clarifying      Discipline Responsible: Psychoeducational Specialist      Signature:  Marilou Meyer, CTRS

## 2024-03-13 NOTE — PLAN OF CARE
Problem: Pain  Goal: Verbalizes/displays adequate comfort level or baseline comfort level  3/12/2024 2149 by Guera Bellamy, RN  Outcome: Progressing     Problem: Self Harm/Suicidality  Goal: Will have no self-injury during hospital stay  Description: INTERVENTIONS:  1.  Ensure constant observer at bedside with Q15M safety checks  2.  Maintain a safe environment  3.  Secure patient belongings  4.  Ensure family/visitors adhere to safety recommendations  5.  Ensure safety tray has been added to patient's diet order  6.  Every shift and PRN: Re-assess suicidal risk via Frequent Screener    3/12/2024 2149 by Guera Bellamy, RN  Outcome: Progressing     
[facility]  Day 3 Interdisciplinary Treatment Plan NOTE    Review Date & Time:  3/13/24 0900    Patient was in treatment team    Estimated Length of Stay Update:   3 days  Estimated Discharge Date Update:  today    EDUCATION:   Learner Progress Toward Treatment Goals: Reviewed results and recommendations of this team    Method: Small group    Outcome: Verbalized understanding    PATIENT GOALS: be well\"    PLAN/TREATMENT RECOMMENDATIONS UPDATE: discharged today to home with medications and follow up    GOALS UPDATE:   Time frame for Short-Term Goals:  3 days      Kamini Peguero RN  
CARE PLAN INITIATED.  
AND NO VOICED DELUSIONS. PT. VISITED WITH MOM WITHOUT EVENT. PT. IS PLEASANT AND COOPERATIVE ON ASSESSMENT.   
anxiety and depression. Patient denied SI/HI.  Patient denied hallucinations and delusions. Patient pleasant and cooperative. Safety rounds done q15 minutes. Will continue to monitor.  
underlying conditions and offer medication as ordered  2. Educate regarding involuntary admission procedures and rules  3. Contain excessive/inappropriate behavior per unit and hospital policies  3/12/2024 0918 by Shazia Qureshi, RN  Outcome: Progressing  3/11/2024 2129 by Damian Watkins, RN  Outcome: Progressing

## 2024-03-13 NOTE — DISCHARGE INSTRUCTIONS
[address], [place] [address]   Phone: [phone]   Fax: [phone]     [facility] [place]   Address: [address], [place] [address]   Phone: [phone]   Fax: [phone]     [facility]   Address: [address], [place] [address]   Phone: [phone]   Fax: [phone]     Dr. Obrienolone   [address], [place] [address]   Phone:[phone]   Fax: [phone]     [facility]- counseling only   [address] [place] [address]   Phone: [phone]   Fax:[phone]     [facility]   [address] [place] [address]   Phone: [phone]   Fax: [phone]     [facility]   [address] [place] [address]   Phone: [phone]   Fax: [phone]     [facility] (only counseling no medications)    [address], [place] [address]   Phone: [phone]   Fax: [phone]     Yazmin GIBSON MD   [address] [place] [address]   Phone: [phone]   Fax:[phone]     [facility]   [address] [place] [address]   Phone: [phone]   Fax: [phone]     [facility]   [address] [place] [address]   Phone: [phone]   Fax: [phone]

## 2024-03-13 NOTE — CARE COORDINATION
LUPE received a return call from pt wife Berenice [phone] (NELSON signed). LUPE informed Berenice of pt discharge for today. Berenice stated the pt already texted her that he is home. No additional questions or concerns voiced.

## 2024-03-13 NOTE — GROUP NOTE
Group Therapy Note    Date: 3/13/2024    Group Start Time: 0950  Group End Time: 1035  Group Topic: Psychoeducation    [facility]    Marilou Meyer, CTRS    Date: 3/13/2024    Type of Group: Psychoeducation    Wellness Binder Information  Module Name:  laws of attraction     Patient's Goal:  will be able to id keys of the laws of attraction and how one can benefit.   Notes: pleasant and engaged in group, appeared to be an active listener willing to verbally participate when prompted. Accepting of handout.     Status After Intervention:  Improved    Participation Level: Active Listener and Interactive    Participation Quality: Appropriate, Attentive, and Sharing      Speech:  normal      Thought Process/Content: Logical      Affective Functioning: Congruent      Mood: euthymic      Level of consciousness:  Alert, Oriented x4, and Attentive      Response to Learning: Able to verbalize/acknowledge new learning, Able to retain information, and Progressing to goal      Endings: None Reported    Modes of Intervention: Education, Support, Socialization, and Problem-solving      Discipline Responsible: Psychoeducational Specialist      Signature:  Marilou Meyer, CTRS

## 2024-03-13 NOTE — PROGRESS NOTES
[facility]  Admission Note     Admission Type:   Involuntary - Not Signed in Upon Admission     Reason for admission:  Reason for Admission: \"I REALLY THINK IT WAS A CALL FOR HELP\"      Addictive Behavior:   Addictive Behavior  In the Past 3 Months, Have You Felt or Has Someone Told You That You Have a Problem With  : None    Medical Problems:   Past Medical History:   Diagnosis Date    ADD (attention deficit disorder)     Broken back     Fibromyalgia     Fibromyalgia     Viral meningitis        Status EXAM:  Mental Status and Behavioral Exam  Normal: No  Level of Assistance: Independent/Self  Facial Expression: Worried, Elevated  Affect: Congruent  Level of Consciousness: Alert  Frequency of Checks: 4 times per hour, close  Mood:Normal: No  Mood: Depressed, Anxious  Motor Activity:Normal: Yes  Eye Contact: Good  Observed Behavior: Cooperative, Friendly  Sexual Misconduct History: Current - no  Preception: [place] to person, [place] to time, [place] to place, [place] to situation  Attention:Normal: Yes  Thought Processes: Unremarkable  Thought Content:Normal: Yes  Depression Symptoms: Feelings of helplessness  Anxiety Symptoms: Generalized  Berta Symptoms: No problems reported or observed.  Hallucinations: None  Delusions: No  Memory:Normal: Yes  Insight and Judgment: No  Insight and Judgment: Other (comment) (impaired)    Tobacco Screening:  Practical Counseling, on admission, fuentes X, if applicable and completed (first 3 are required if patient doesn't refuse):            ( ) Recognizing danger situations (included triggers and roadblocks)                    ( ) Coping skills (new ways to manage stress,relaxation techniques, changing routine, distraction)                                                           ( ) Basic information about quitting (benefits of quitting, techniques in how to quit, available resources  ( ) [facility]                        
4 Eyes Skin Assessment     NAME:  Dominic Phan  YOB: 1980  MEDICAL RECORD NUMBER:  [id number]    The patient is being assessed for  Admission    I agree that at least one RN has performed a thorough Head to Toe Skin Assessment on the patient. ALL assessment sites listed below have been assessed.      Areas assessed by both nurses:    Head, Face, Ears, Shoulders, Back, Chest, Arms, Elbows, Hands, Sacrum. Buttock, Coccyx, Ischium, and Legs. Feet and Heels        Does the Patient have a Wound? No noted wound(s)       Sunday Prevention initiated by RN: No  Wound Care Orders initiated by RN: No    Pressure Injury (Stage 3,4, Unstageable, DTI, NWPT, and Complex wounds) if present, place Wound referral order by RN under : No    New Ostomies, if present place, Ostomy referral order under : No     Nurse 1 eSignature: Electronically signed by Kamini Peguero RN on 3/11/24 at 10:12 AM EDT    **SHARE this note so that the co-signing nurse can place an eSignature**    Nurse 2 eSignature: Electronically signed by Shazia Qureshi RN on 3/11/24 at 10:14 AM EDT  
[facility]  Initial Interdisciplinary Treatment Plan NOTE    Review Date & Time: 3/12/2024 1000    Patient was not in treatment team    Admission Type:   Admission Type: Involuntary    Reason for admission:  Reason for Admission: \"I REALLY THINK IT WAS A CALL FOR HELP\"      Estimated Length of Stay Update:  3-5 days  Estimated Discharge Date Update: 3/15/2024    EDUCATION:   Learner Progress Toward Treatment Goals: Reviewed results and recommendations of this team    Method: Individual    Outcome: Verbalized understanding    PATIENT GOALS: medication compliance and group therapy attendance    PLAN/TREATMENT RECOMMENDATIONS UPDATE:medication compliance and group therapy attendance    GOALS UPDATE:   Time frame for Short-Term Goals: 3-5 days    Shazia Qureshi RN      
CLINICAL PHARMACY NOTE: MEDS TO BEDS    Total # of Prescriptions Filled: 1   The following medications were delivered to the patient:  Divalproex sodium 500 mg    Additional Documentation:   Pt picked up in the pharmacy with Kamini HEREDIA  
Leisure assessment completed.   
Patient denies suicidal ideation, homicidal ideations and AVH.  Presents calm, pleasant, and cooperative during assessment.  Patient is out on the unit and is social with staff.  Medications taken without issue.  No complaints or concerns verbalized at this time.  No unit problems reported.  Will continue to observe and support.  
Patient is pleasant and cooperative, read a book today, is social with peers and staff. Patient denies any hallucinations, denies suicidal ideation, denies homicidal ideation. Patient appetite is good, behavior in control. Patient is compliant with medications and attends group therapy  
Patient resting with eyes closed. Respirations even and unlabored. No signs of distress. Purposeful rounding continued.  
manage stress,relaxation techniques, changing routine, distraction)                                                           ( ) Basic information about quitting (benefits of quitting, techniques in how to quit, available resources  ( ) [facility]                                                                                                                   ( ) Patient refused counseling  ( ) Patient refused referral  ( ) Patient refused prescription upon discharge  (X ) Patient has not smoked in the last 30 days    Metabolic Screening:    Lab Results   Component Value Date    LABA1C 5.5 03/12/2024       Lab Results   Component Value Date    CHOL 191 03/12/2024     Lab Results   Component Value Date    TRIG 100 03/12/2024     Lab Results   Component Value Date    HDL 86 03/12/2024     No components found for: \"LDLCAL\"  No components found for: \"LABVLDL\"    Kamini Peguero RN    
Respiratory  [] CV  [] GI  []   [] Musculoskeletal  [] Skin/Breast  [] Neurological  [] Endocrine  [] Heme/Lymph  [] Allergic/Immunologic    Explanation:     MEDICATIONS:    Current Facility-Administered Medications:     acetaminophen (TYLENOL) tablet 650 mg, 650 mg, Oral, Q6H PRN, Janie Mccarthy MD, 650 mg at 03/11/24 2002    magnesium hydroxide (MILK OF MAGNESIA) 400 MG/5ML suspension 30 mL, 30 mL, Oral, Daily PRN, Janie Mccarthy MD    aluminum & magnesium hydroxide-simethicone (MAALOX) 200-200-20 MG/5ML suspension 30 mL, 30 mL, Oral, PRN, Janie Mccarthy MD    hydrOXYzine pamoate (VISTARIL) capsule 50 mg, 50 mg, Oral, TID PRN, Janie Mccarthy MD    haloperidol (HALDOL) tablet 5 mg, 5 mg, Oral, Q6H PRN **OR** haloperidol lactate (HALDOL) injection 5 mg, 5 mg, IntraMUSCular, Q6H PRN, Janie Mccarthy MD    melatonin tablet 3 mg, 3 mg, Oral, Nightly PRN, Janie Mccarthy MD, 3 mg at 03/12/24 2102    divalproex (DEPAKOTE ER) extended release tablet 500 mg, 500 mg, Oral, Nightly, Dellick, Rose Marie B, APRN - CNP, 500 mg at 03/12/24 2102    cyclobenzaprine (FLEXERIL) tablet 10 mg, 10 mg, Oral, TID PRN, Dellick, Rose Marie B, APRN - CNP, 10 mg at 03/12/24 2133    milnacipran HCl (SAVELLA) tablet 50 mg, 50 mg, Oral, BID, Dellick, Rose Marie B, APRN - CNP, 50 mg at 03/13/24 0942    pregabalin (LYRICA) capsule 150 mg, 150 mg, Oral, Daily, Dellick, Rose Marie B, APRN - CNP, 150 mg at 03/13/24 0943    traMADol (ULTRAM) tablet 50 mg, 50 mg, Oral, Q6H PRN, Dellick, Rose Marie B, APRN - CNP, 50 mg at 03/13/24 0941      Examination:  BP (!) 137/91   Pulse 86   Temp 98.4 °F (36.9 °C)   Resp 16   Ht 1.905 m (6' 3\")   Wt 104.3 kg (230 lb)   SpO2 99%   BMI 28.75 kg/m²   Gait - steady  Medication side effects(SE): None    Mental Status Examination:    Level of consciousness:  within normal limits   Appearance:  good grooming and good hygiene  Behavior/Motor:  no abnormalities noted  Attitude toward examiner:  cooperative, attentive, 
normalities.  Denies SI/HI intent or plan   Language: able to name objects and repeate phrases  Remote Memory: intact  Recent Memory: intact  Cognition:  oriented to person, place, and time   Fund of Knowledge: Vocabulary intact, pt is aware of current events and past history  Attetion and Concentration intact  Insight fair   Judgement fair       ASSESSMENT: Patient symptoms are:  [] Well controlled  [x] Improving  [] Worsening  [] No change      Diagnosis:  Principal Problem:    Adjustment disorder with mixed disturbance of emotions and conduct  Resolved Problems:    Major depression, single episode      LABS:    Recent Labs     03/10/24  2107   WBC 6.0   HGB 15.5        Recent Labs     03/10/24  2108      K 3.7      CO2 26   BUN 10   CREATININE 1.0   GLUCOSE 94     Recent Labs     03/10/24  2108   BILITOT 0.3   ALKPHOS 75   AST 20   ALT 28     Lab Results   Component Value Date/Time    BARBSCNU NEGATIVE 03/10/2024 09:07 PM    LABBENZ NEGATIVE 03/10/2024 09:07 PM    LABMETH NEGATIVE 03/10/2024 09:07 PM    ETOH <10 09/06/2012 03:25 PM     No results found for: \"TSH\", \"FREET4\"  No results found for: \"LITHIUM\"  No results found for: \"VALPROATE\", \"CBMZ\"        Treatment Plan:  Reviewed current Medications with the patient.   Risks, benefits, side effects, drug-to-drug interactions and alternatives to treatment were discussed.  Collateral information:   CD evaluation  Encourage patient to attend group and other milieu activities.  Discharge planning discussed with the patient and treatment team.    Continue Depakote  mg nightly    PSYCHOTHERAPY/COUNSELING:  [x] Therapeutic interview  [x] Supportive  [] CBT  [] Ongoing  [] Other    [x] Patient continues to need, on a daily basis, active treatment furnished directly by or requiring the supervision of inpatient psychiatric personnel      Anticipated Length of stay: 3 to 7 days based on stability            Electronically signed by Rose Marie Ortiz

## 2024-03-13 NOTE — TRANSITION OF CARE
[facility] Transition Record to Provider    Patient Name: Dominic Phan  YOB: 1980   Medical Record Number: [id number]  Date of Admission: 3/10/2024  8:31 PM   Date of Discharge:  3/13/24    Attending Provider: Janie Obando MD   Discharging Provider:  ZAIRE OBANDO  To contact this individual call  [phone] and ask the  to page.  If unavailable, ask to be transferred to Behavioral Health Provider on call.  A Behavioral Health Provider will be available on call 24/7 and during holidays.    Primary Care Provider: Awadalla, Maged I, MD    Allergies   Allergen Reactions    Pcn [Penicillins]        Reason for Admission:   CIRCUMSTANCES OF ADMISSION: Dominic Phan has a past psychiatric history of ADD and presented to the ED  brought to the ED by EMS.  After patient reported suicidal ideations with a plan to use a gun for an unknown duration  period of time precipitating factors include marriage difficulties and finding out that his wife of 20 years had been cheating on him patient was placed on an involuntary hold by the ED physician.      Admission Diagnosis: Suicidal ideation [R45.851]  Major depression, single episode [F32.9]    * No surgery found *    Results for orders placed or performed during the hospital encounter of 03/10/24   CBC with Auto Differential   Result Value Ref Range    WBC 6.0 4.5 - 11.5 k/uL    RBC 5.26 3.80 - 5.80 m/uL    Hemoglobin 15.5 12.5 - 16.5 g/dL    Hematocrit 47.1 37.0 - 54.0 %    MCV 89.5 80.0 - 99.9 fL    MCH 29.5 26.0 - 35.0 pg    MCHC 32.9 32.0 - 34.5 g/dL    RDW 12.8 11.5 - 15.0 %    Platelets 299 130 - 450 k/uL    MPV 9.8 7.0 - 12.0 fL    Neutrophils % 67 43.0 - 80.0 %    Lymphocytes % 23 20.0 - 42.0 %    Monocytes % 7 2.0 - 12.0 %    Eosinophils % 1 0 - 6 %    Basophils % 1 0.0 - 2.0 %    Immature Granulocytes 0 0.0 - 5.0 %    Neutrophils Absolute 4.00 1.80 - 7.30 k/uL    Lymphocytes Absolute 1.39 (L) 1.50 - 4.00 k/uL    Monocytes

## 2024-03-13 NOTE — DISCHARGE SUMMARY
DISCHARGE SUMMARY      Patient ID:  Dominic Phan  [id number]  44 y.o.  1980    Admit date: 3/10/2024    Discharge date and time: 3/13/2024    Admitting Physician: Janie Mccarthy MD     Discharge Physician: Dr Fabio MENA    Discharge Diagnoses:   Patient Active Problem List   Diagnosis    Adjustment disorder with mixed disturbance of emotions and conduct   Cluster B personality traits    Admission Condition: poor    Discharged Condition: stable    Admission Circumstance: Dominic Phan has a past psychiatric history of ADD and presented to the ED  brought to the ED by EMS.  After patient reported suicidal ideations with a plan to use a gun for an unknown duration  period of time precipitating factors include marriage difficulties and finding out that his wife of 20 years had been cheating on him patient was placed on an involuntary hold by the ED physician.        PAST MEDICAL/PSYCHIATRIC HISTORY:   Past Medical History:   Diagnosis Date    ADD (attention deficit disorder)     Broken back     Fibromyalgia     Fibromyalgia     Viral meningitis        FAMILY/SOCIAL HISTORY:  History reviewed. No pertinent family history.  Social History     Socioeconomic History    Marital status:      Spouse name: Not on file    Number of children: 3    Years of education: 16+    Highest education level: Not on file   Occupational History    Not on file   Tobacco Use    Smoking status: Never    Smokeless tobacco: Never   Vaping Use    Vaping Use: Never used   Substance and Sexual Activity    Alcohol use: Yes     Alcohol/week: 8.0 standard drinks of alcohol     Types: 6 Glasses of wine, 2 Shots of liquor per week     Comment: occ    Drug use: No    Sexual activity: Yes     Partners: Female   Other Topics Concern    Not on file   Social History Narrative    Not on file     Social Determinants of Health     Financial Resource Strain: Not on file   Food Insecurity: No Food Insecurity (3/11/2024)    Hunger